# Patient Record
Sex: MALE | HISPANIC OR LATINO | URBAN - METROPOLITAN AREA
[De-identification: names, ages, dates, MRNs, and addresses within clinical notes are randomized per-mention and may not be internally consistent; named-entity substitution may affect disease eponyms.]

---

## 2022-03-15 ENCOUNTER — APPOINTMENT (RX ONLY)
Dept: URBAN - METROPOLITAN AREA CLINIC 325 | Facility: CLINIC | Age: 32
Setting detail: DERMATOLOGY
End: 2022-03-15

## 2022-03-15 DIAGNOSIS — L72.8 OTHER FOLLICULAR CYSTS OF THE SKIN AND SUBCUTANEOUS TISSUE: ICD-10-CM | Status: INADEQUATELY CONTROLLED

## 2022-03-15 DIAGNOSIS — L63.8 OTHER ALOPECIA AREATA: ICD-10-CM

## 2022-03-15 PROCEDURE — 99203 OFFICE O/P NEW LOW 30 MIN: CPT

## 2022-03-15 PROCEDURE — ? TREATMENT REGIMEN

## 2022-03-15 PROCEDURE — ? PRESCRIPTION

## 2022-03-15 PROCEDURE — ? COUNSELING

## 2022-03-15 PROCEDURE — ? DEFER

## 2022-03-15 RX ORDER — CLOBETASOL PROPIONATE 0.5 MG/G
AEROSOL, FOAM TOPICAL
Qty: 100 | Refills: 2 | Status: ERX | COMMUNITY
Start: 2022-03-15

## 2022-03-15 RX ADMIN — CLOBETASOL PROPIONATE: 0.5 AEROSOL, FOAM TOPICAL at 00:00

## 2022-03-15 ASSESSMENT — LOCATION ZONE DERM
LOCATION ZONE: NECK
LOCATION ZONE: SCALP

## 2022-03-15 ASSESSMENT — LOCATION DETAILED DESCRIPTION DERM
LOCATION DETAILED: LEFT INFERIOR LATERAL NECK
LOCATION DETAILED: MID-OCCIPITAL SCALP

## 2022-03-15 ASSESSMENT — LOCATION SIMPLE DESCRIPTION DERM
LOCATION SIMPLE: LEFT ANTERIOR NECK
LOCATION SIMPLE: POSTERIOR SCALP

## 2022-03-15 NOTE — PROCEDURE: MIPS QUALITY
Detail Level: Detailed
Quality 137: Melanoma: Continuity Of Care - Recall System: Recall system not utilized, reason not otherwise specified
Quality 397: Melanoma: Reporting: Pathology does not include pT category and/or statement on Breslow depth, ulceration, and pT1 mitotic reporting.
Quality 226: Preventive Care And Screening: Tobacco Use: Screening And Cessation Intervention: Tobacco Screening not Performed for Unknown Reasons
Quality 410: Psoriasis Clinical Response To Oral Systemic Or Biologic Mediations: Psoriasis Assessment Tool NOT Documented
Quality 130: Documentation Of Current Medications In The Medical Record: Current Medications Documented
Quality 138: Melanoma: Coordination Of Care: Treatment plan not communicated, reason not otherwise specified.
Quality 47: Advance Care Plan: Advance care planning not documented, reason not otherwise specified.
Quality 337: Tuberculosis Prevention For Psoriasis And Psoriatic Arthritis Patients On A Biological Immune Response Modifier: No documentation of negative or managed positive TB screen
Quality 431: Preventive Care And Screening: Unhealthy Alcohol Use - Screening: Patient not identified as an unhealthy alcohol user when screened for unhealthy alcohol use using a systematic screening method
rolling walker

## 2022-03-15 NOTE — PROCEDURE: TREATMENT REGIMEN
Detail Level: Zone
Initiate Treatment: clobetasol 0.05 % topical foam \\nQuantity: 100.0 g  Days Supply: 30\\nSig: APPLY TO THE AFFECTED AREA ON SCALP BID UP TO 2 WEEKS, OFF FOR 2 WEEKS THEN REPEAT.

## 2022-03-18 ENCOUNTER — APPOINTMENT (RX ONLY)
Dept: URBAN - METROPOLITAN AREA CLINIC 325 | Facility: CLINIC | Age: 32
Setting detail: DERMATOLOGY
End: 2022-03-18

## 2022-03-18 DIAGNOSIS — L72.8 OTHER FOLLICULAR CYSTS OF THE SKIN AND SUBCUTANEOUS TISSUE: ICD-10-CM

## 2022-03-18 DIAGNOSIS — L63.8 OTHER ALOPECIA AREATA: ICD-10-CM

## 2022-03-18 PROCEDURE — ? FULL BODY SKIN EXAM - DECLINED

## 2022-03-18 PROCEDURE — ? COUNSELING

## 2022-03-18 PROCEDURE — 11901 INJECT SKIN LESIONS >7: CPT

## 2022-03-18 PROCEDURE — ? INTRALESIONAL KENALOG

## 2022-03-18 ASSESSMENT — LOCATION SIMPLE DESCRIPTION DERM
LOCATION SIMPLE: POSTERIOR NECK
LOCATION SIMPLE: LEFT ANTERIOR NECK
LOCATION SIMPLE: LEFT CHEEK
LOCATION SIMPLE: POSTERIOR SCALP

## 2022-03-18 ASSESSMENT — LOCATION DETAILED DESCRIPTION DERM
LOCATION DETAILED: RIGHT INFERIOR OCCIPITAL SCALP
LOCATION DETAILED: POSTERIOR MID-PARIETAL SCALP
LOCATION DETAILED: LEFT CENTRAL MANDIBULAR CHEEK
LOCATION DETAILED: MID-OCCIPITAL SCALP
LOCATION DETAILED: LEFT INFERIOR OCCIPITAL SCALP
LOCATION DETAILED: RIGHT SUPERIOR POSTERIOR NECK
LOCATION DETAILED: MID POSTERIOR NECK
LOCATION DETAILED: LEFT SUPERIOR LATERAL NECK

## 2022-03-18 ASSESSMENT — LOCATION ZONE DERM
LOCATION ZONE: SCALP
LOCATION ZONE: FACE
LOCATION ZONE: NECK

## 2022-03-18 NOTE — PROCEDURE: MIPS QUALITY
Detail Level: Detailed
Quality 137: Melanoma: Continuity Of Care - Recall System: Recall system not utilized, reason not otherwise specified
Quality 397: Melanoma: Reporting: Pathology does not include pT category and/or statement on Breslow depth, ulceration, and pT1 mitotic reporting.
Quality 226: Preventive Care And Screening: Tobacco Use: Screening And Cessation Intervention: Tobacco Screening not Performed for Unknown Reasons
Quality 410: Psoriasis Clinical Response To Oral Systemic Or Biologic Mediations: Psoriasis Assessment Tool NOT Documented
Quality 130: Documentation Of Current Medications In The Medical Record: Current Medications Documented
Quality 138: Melanoma: Coordination Of Care: Treatment plan not communicated, reason not otherwise specified.
Quality 47: Advance Care Plan: Advance care planning not documented, reason not otherwise specified.
Quality 337: Tuberculosis Prevention For Psoriasis And Psoriatic Arthritis Patients On A Biological Immune Response Modifier: No documentation of negative or managed positive TB screen
Quality 431: Preventive Care And Screening: Unhealthy Alcohol Use - Screening: Patient not identified as an unhealthy alcohol user when screened for unhealthy alcohol use using a systematic screening method

## 2022-04-22 ENCOUNTER — APPOINTMENT (RX ONLY)
Dept: URBAN - METROPOLITAN AREA CLINIC 325 | Facility: CLINIC | Age: 32
Setting detail: DERMATOLOGY
End: 2022-04-22

## 2022-04-22 DIAGNOSIS — L63.8 OTHER ALOPECIA AREATA: ICD-10-CM

## 2022-04-22 PROCEDURE — 11901 INJECT SKIN LESIONS >7: CPT

## 2022-04-22 PROCEDURE — ? INTRALESIONAL KENALOG

## 2022-04-22 PROCEDURE — ? COUNSELING

## 2022-04-22 PROCEDURE — ? FULL BODY SKIN EXAM - DECLINED

## 2022-04-22 ASSESSMENT — LOCATION ZONE DERM
LOCATION ZONE: SCALP
LOCATION ZONE: NECK

## 2022-04-22 ASSESSMENT — LOCATION DETAILED DESCRIPTION DERM
LOCATION DETAILED: MID-OCCIPITAL SCALP
LOCATION DETAILED: LEFT INFERIOR OCCIPITAL SCALP
LOCATION DETAILED: RIGHT SUPERIOR POSTERIOR NECK
LOCATION DETAILED: POSTERIOR MID-PARIETAL SCALP
LOCATION DETAILED: RIGHT INFERIOR OCCIPITAL SCALP
LOCATION DETAILED: MID POSTERIOR NECK

## 2022-04-22 ASSESSMENT — LOCATION SIMPLE DESCRIPTION DERM
LOCATION SIMPLE: POSTERIOR SCALP
LOCATION SIMPLE: POSTERIOR NECK

## 2022-05-20 ENCOUNTER — APPOINTMENT (RX ONLY)
Dept: URBAN - METROPOLITAN AREA CLINIC 325 | Facility: CLINIC | Age: 32
Setting detail: DERMATOLOGY
End: 2022-05-20

## 2022-05-20 DIAGNOSIS — L63.8 OTHER ALOPECIA AREATA: ICD-10-CM | Status: WORSENING

## 2022-05-20 PROCEDURE — ? COUNSELING

## 2022-05-20 PROCEDURE — ? FULL BODY SKIN EXAM - DECLINED

## 2022-05-20 PROCEDURE — ? INTRALESIONAL KENALOG

## 2022-05-20 PROCEDURE — 11900 INJECT SKIN LESIONS </W 7: CPT

## 2022-05-20 ASSESSMENT — LOCATION ZONE DERM: LOCATION ZONE: SCALP

## 2022-05-20 ASSESSMENT — LOCATION DETAILED DESCRIPTION DERM: LOCATION DETAILED: MID-OCCIPITAL SCALP

## 2022-05-20 ASSESSMENT — LOCATION SIMPLE DESCRIPTION DERM: LOCATION SIMPLE: POSTERIOR SCALP

## 2022-05-20 NOTE — PROCEDURE: INTRALESIONAL KENALOG
Company is requesting Blood Glucose / reagent strips   I do not see this on the pt's medication list  Medical Necessity Clause: This procedure was medically necessary because the lesions that were treated were: irritating

## 2022-06-27 ENCOUNTER — APPOINTMENT (RX ONLY)
Dept: URBAN - METROPOLITAN AREA CLINIC 325 | Facility: CLINIC | Age: 32
Setting detail: DERMATOLOGY
End: 2022-06-27

## 2022-06-27 DIAGNOSIS — L63.8 OTHER ALOPECIA AREATA: ICD-10-CM

## 2022-06-27 PROCEDURE — ? INTRALESIONAL KENALOG

## 2022-06-27 PROCEDURE — ? COUNSELING

## 2022-06-27 PROCEDURE — 11900 INJECT SKIN LESIONS </W 7: CPT

## 2022-06-27 PROCEDURE — ? FULL BODY SKIN EXAM - DECLINED

## 2022-06-27 ASSESSMENT — LOCATION ZONE DERM: LOCATION ZONE: SCALP

## 2022-06-27 ASSESSMENT — LOCATION DETAILED DESCRIPTION DERM
LOCATION DETAILED: LEFT INFERIOR OCCIPITAL SCALP
LOCATION DETAILED: MID-OCCIPITAL SCALP

## 2022-06-27 ASSESSMENT — LOCATION SIMPLE DESCRIPTION DERM: LOCATION SIMPLE: POSTERIOR SCALP
